# Patient Record
Sex: MALE | Race: WHITE | Employment: OTHER | URBAN - METROPOLITAN AREA
[De-identification: names, ages, dates, MRNs, and addresses within clinical notes are randomized per-mention and may not be internally consistent; named-entity substitution may affect disease eponyms.]

---

## 2017-05-18 ENCOUNTER — HOSPITAL ENCOUNTER (EMERGENCY)
Age: 57
Discharge: HOME | End: 2017-05-18
Attending: EMERGENCY MEDICINE
Payer: COMMERCIAL

## 2017-05-18 VITALS
RESPIRATION RATE: 16 BRPM | HEIGHT: 70 IN | BODY MASS INDEX: 22.9 KG/M2 | HEART RATE: 87 BPM | SYSTOLIC BLOOD PRESSURE: 124 MMHG | TEMPERATURE: 98 F | WEIGHT: 160 LBS | DIASTOLIC BLOOD PRESSURE: 82 MMHG | OXYGEN SATURATION: 95 %

## 2017-05-18 DIAGNOSIS — T15.92XA FB EYE, LEFT, INITIAL ENCOUNTER: Primary | ICD-10-CM

## 2017-05-18 PROCEDURE — G0382 LEV 3 HOSP TYPE B ED VISIT: HCPCS

## 2017-05-18 PROCEDURE — 65222 REMOVE FOREIGN BODY FROM EYE: CPT

## 2017-05-18 PROCEDURE — 6370000000 HC RX 637 (ALT 250 FOR IP): Performed by: PHYSICIAN ASSISTANT

## 2017-05-18 RX ORDER — PROPARACAINE HYDROCHLORIDE 5 MG/ML
1 SOLUTION/ DROPS OPHTHALMIC ONCE
Status: COMPLETED | OUTPATIENT
Start: 2017-05-18 | End: 2017-05-18

## 2017-05-18 RX ORDER — POLYMYXIN B SULFATE AND TRIMETHOPRIM 1; 10000 MG/ML; [USP'U]/ML
1 SOLUTION OPHTHALMIC EVERY 4 HOURS
Qty: 1 BOTTLE | Refills: 0 | Status: SHIPPED | OUTPATIENT
Start: 2017-05-18 | End: 2017-05-28

## 2017-05-18 RX ADMIN — PROPARACAINE HYDROCHLORIDE 1 DROP: 5 SOLUTION/ DROPS OPHTHALMIC at 12:22

## 2017-05-18 ASSESSMENT — ENCOUNTER SYMPTOMS
ABDOMINAL PAIN: 0
NAUSEA: 0
COUGH: 0
COLOR CHANGE: 0
PHOTOPHOBIA: 0
DIARRHEA: 0
VOMITING: 0
BACK PAIN: 0
EYE PAIN: 0
EYE ITCHING: 0
EYE DISCHARGE: 0
EYE REDNESS: 0
SHORTNESS OF BREATH: 0

## 2020-09-21 ENCOUNTER — HOSPITAL ENCOUNTER (OUTPATIENT)
Age: 60
Setting detail: SPECIMEN
Discharge: HOME | End: 2020-09-21
Payer: COMMERCIAL

## 2020-09-21 LAB
ALBUMIN SERPL-MCNC: 4.4 G/DL (ref 3.5–5.2)
ALBUMIN/GLOBULIN RATIO: 1.4 (ref 1–2.5)
ALP BLD-CCNC: 70 U/L (ref 40–129)
ALT SERPL-CCNC: 19 U/L (ref 5–41)
ANION GAP SERPL CALCULATED.3IONS-SCNC: 12 MMOL/L (ref 9–17)
AST SERPL-CCNC: 16 U/L
BILIRUB SERPL-MCNC: 0.82 MG/DL (ref 0.3–1.2)
BUN BLDV-MCNC: 11 MG/DL (ref 6–20)
BUN/CREAT BLD: NORMAL (ref 9–20)
CALCIUM SERPL-MCNC: 9.9 MG/DL (ref 8.6–10.4)
CHLORIDE BLD-SCNC: 101 MMOL/L (ref 98–107)
CHOLESTEROL/HDL RATIO: 6.7
CHOLESTEROL: 229 MG/DL
CO2: 26 MMOL/L (ref 20–31)
CREAT SERPL-MCNC: 0.79 MG/DL (ref 0.7–1.2)
GFR AFRICAN AMERICAN: >60 ML/MIN
GFR NON-AFRICAN AMERICAN: >60 ML/MIN
GFR SERPL CREATININE-BSD FRML MDRD: NORMAL ML/MIN/{1.73_M2}
GFR SERPL CREATININE-BSD FRML MDRD: NORMAL ML/MIN/{1.73_M2}
GLUCOSE BLD-MCNC: 89 MG/DL (ref 70–99)
HCT VFR BLD CALC: 51.5 % (ref 40.7–50.3)
HDLC SERPL-MCNC: 34 MG/DL
HEMOGLOBIN: 16.6 G/DL (ref 13–17)
LDL CHOLESTEROL: 155 MG/DL (ref 0–130)
MCH RBC QN AUTO: 31.3 PG (ref 25.2–33.5)
MCHC RBC AUTO-ENTMCNC: 32.2 G/DL (ref 28.4–34.8)
MCV RBC AUTO: 97.2 FL (ref 82.6–102.9)
NRBC AUTOMATED: 0 PER 100 WBC
PDW BLD-RTO: 13.4 % (ref 11.8–14.4)
PLATELET # BLD: 342 K/UL (ref 138–453)
PMV BLD AUTO: 10.4 FL (ref 8.1–13.5)
POTASSIUM SERPL-SCNC: 5.2 MMOL/L (ref 3.7–5.3)
PROSTATE SPECIFIC ANTIGEN: 5.42 UG/L
RBC # BLD: 5.3 M/UL (ref 4.21–5.77)
SODIUM BLD-SCNC: 139 MMOL/L (ref 135–144)
TOTAL PROTEIN: 7.5 G/DL (ref 6.4–8.3)
TRIGL SERPL-MCNC: 201 MG/DL
VITAMIN D 25-HYDROXY: 42.5 NG/ML (ref 30–100)
VLDLC SERPL CALC-MCNC: ABNORMAL MG/DL (ref 1–30)
WBC # BLD: 9.9 K/UL (ref 3.5–11.3)

## 2020-09-23 PROBLEM — F17.200 CURRENT SMOKER: Status: ACTIVE | Noted: 2018-06-07

## 2020-09-23 PROBLEM — Z12.11 ENCOUNTER FOR SCREENING FOR MALIGNANT NEOPLASM OF COLON: Status: ACTIVE | Noted: 2020-08-20

## 2020-09-23 PROBLEM — M50.30 DEGENERATIVE DISC DISEASE, CERVICAL: Status: ACTIVE | Noted: 2018-06-04

## 2020-09-23 PROBLEM — M19.019 OSTEOARTHRITIS OF AC (ACROMIOCLAVICULAR) JOINT: Status: ACTIVE | Noted: 2018-09-14

## 2020-09-23 PROBLEM — G89.18 POST-OP PAIN: Status: ACTIVE | Noted: 2018-06-07

## 2020-09-23 PROBLEM — M75.42 IMPINGEMENT SYNDROME OF LEFT SHOULDER: Status: ACTIVE | Noted: 2018-09-14

## 2020-09-25 ENCOUNTER — OFFICE VISIT (OUTPATIENT)
Dept: UROLOGY | Age: 60
End: 2020-09-25
Payer: COMMERCIAL

## 2020-09-25 VITALS — HEART RATE: 87 BPM | SYSTOLIC BLOOD PRESSURE: 102 MMHG | TEMPERATURE: 98.6 F | DIASTOLIC BLOOD PRESSURE: 75 MMHG

## 2020-09-25 PROCEDURE — 4004F PT TOBACCO SCREEN RCVD TLK: CPT | Performed by: UROLOGY

## 2020-09-25 PROCEDURE — G8420 CALC BMI NORM PARAMETERS: HCPCS | Performed by: UROLOGY

## 2020-09-25 PROCEDURE — 3017F COLORECTAL CA SCREEN DOC REV: CPT | Performed by: UROLOGY

## 2020-09-25 PROCEDURE — 99204 OFFICE O/P NEW MOD 45 MIN: CPT | Performed by: UROLOGY

## 2020-09-25 PROCEDURE — G8427 DOCREV CUR MEDS BY ELIG CLIN: HCPCS | Performed by: UROLOGY

## 2020-09-25 ASSESSMENT — ENCOUNTER SYMPTOMS
ABDOMINAL PAIN: 0
COLOR CHANGE: 0
EYE PAIN: 0
WHEEZING: 0
COUGH: 0
EYE REDNESS: 0
NAUSEA: 0
BACK PAIN: 0
VOMITING: 0
SHORTNESS OF BREATH: 0

## 2020-09-25 NOTE — LETTER
[address] [id number]  Dept: [phone]  Dept Fax: [phone]        9/25/20    Patient: Rae Amos  YOB: 1960    Dear ADE Gallagher CNP,    I had the pleasure of seeing one of your patients, Yusuf Tolliver today in the office today. Below are the relevant portions of my assessment and plan of care. IMPRESSION:  1. Elevated PSA        PLAN:  He was found to have an elevated PSA. He does not remember any previous PSAs. We discussed biopsy, but he wants to hold off for now. Will repeat PSA in 3 months. Prescriptions Ordered:  No orders of the defined types were placed in this encounter. Orders Placed:  Orders Placed This Encounter   Procedures    PSA, Diagnostic     Standing Status:   Future     Standing Expiration Date:   9/25/2021         Thank you for allowing me to participate in the care of this patient. I will keep you updated on this patient's follow up and I look forward to serving you and your patients again in the future.         Juarez Everett MD

## 2020-09-25 NOTE — PROGRESS NOTES
[address] [address]  Dept: [phone]  Dept Fax: [phone] [facility] Urology Office Note - New patient    Patient:  Sandrine Padilla  YOB: 1960  Date: 9/25/2020    The patient is a 61 y.o. male who presents todayfor evaluation of the following problems:   Chief Complaint   Patient presents with    Elevated PSA     ref by PCP    referred by ADE Vora CNP. HPI  He is here for elevated PSA. He recently was found to have an elevated PSA of 5.42. He did not have any voiding complaints at the time this PSA was done. His stream is good, but he goes more frequently than before. No FHx of prostate cancer. No hematuria or dysuria. He has not had a prostate infection in the past.     (Patient's old records have been requested, reviewed and summarized in today's note.)    Summary of old records: N/A    Additional History: N/A    Procedures Today: N/A    Last several PSA's:  Lab Results   Component Value Date    PSA 5.42 (H) 09/21/2020     Last total testosterone:  No results found for: TESTOSTERONE  Urinalysis today:  No results found for this visit on 09/25/20. AUA Symptom Score (9/25/2020):                               Last BUN and creatinine:  Lab Results   Component Value Date    BUN 11 09/21/2020     Lab Results   Component Value Date    CREATININE 0.79 09/21/2020       Additional Lab/Culture results: PSA 5.42. Imaging Reviewed during this Office Visit: none  (results were independently reviewed by physician and radiology report verified)    PAST MEDICAL, FAMILY AND SOCIAL HISTORY:  No past medical history on file. Past Surgical History:   Procedure Laterality Date    CERVICAL FUSION  2018    HERNIA REPAIR      ROTATOR CUFF REPAIR Right 2017     No family history on file.   No outpatient medications have been marked as taking for the 9/25/20 encounter (Office Visit) with [facility] MD Zackary.        Patient has no known allergies. Social History     Tobacco Use   Smoking Status Current Every Day Smoker    Packs/day: 0.75    Years: 25.00    Pack years: 18.75    Types: Cigarettes   Smokeless Tobacco Never Used      (If patient a smoker, smoking cessation counseling offered)   Social History     Substance and Sexual Activity   Alcohol Use No       REVIEW OF SYSTEMS:  Review of Systems    Physical Exam:    This a 61 y.o. male   Vitals:    09/25/20 1203   BP: 102/75   Pulse: 87   Temp: 98.6 °F (37 °C)     There is no height or weight on file to calculate BMI. Physical Exam  Constitutional: Patient in no acute distress. Neuro: Alert and oriented to person, place and time. Psych: Mood normal, affect normal  Skin: No rash noted  Lungs:Respiratory effort is normal  Cardiovascular: Warm & Pink  Abdomen: Soft, non-tender, non-distendedwith no CVA,  No flank tenderness,  Orhepatosplenomegaly   Lymphatics: No palpable lymphadenopathy. Bladder non-tender and not distended. Musculoskeletal: Normal gait and station  Penis normal and circumcised  Urethral meatus normal  Scrotal exam normal  Testicles normal bilaterally  Epididymis normal bilaterally  No evidence of inguinal hernia  Normal rectal tone with no masses  Prostate:  40 grams, smooth, no nodules. Assessment and Plan      1. Elevated PSA           Plan:        He was found to have an elevated PSA. He does not remember any previous PSAs. We discussed biopsy, but he wants to hold off for now. Will repeat PSA in 3 months. Prescriptions Ordered:  No orders of the defined types were placed in this encounter. Orders Placed:  Orders Placed This Encounter   Procedures    PSA, Diagnostic     Standing Status:   Future     Standing Expiration Date:   9/25/2021             Shellie Thayer MD    Agree with the ROS entered by the MA.

## 2020-10-05 ENCOUNTER — TELEPHONE (OUTPATIENT)
Dept: ONCOLOGY | Age: 60
End: 2020-10-05

## 2020-10-07 ENCOUNTER — TELEPHONE (OUTPATIENT)
Dept: ONCOLOGY | Age: 60
End: 2020-10-07

## 2020-10-07 NOTE — LETTER
10/7/2020        [address] [place] [id number]    Dear Flaca Randall: Your healthcare provider has ordered a low dose CT scan of the chest for lung cancer screening. You will find enclosed, information about CT lung screening. Please review the statement of understanding, you will be asked to sign a copy of this at the time of your CT scan    If you have not already been contacted to make the appointment for your scan, please call our scheduling department at [phone]    Keep in mind that CT lung screening does not take the place of smoking cessation. If you are a current smoker, you will find enclosed smoking cessation resources. Please do not hesitate to contact me if you have any questions or concerns.     [address] [place] [address],      [facility] Lung Screening [facility]  [phone]

## 2020-10-08 ENCOUNTER — HOSPITAL ENCOUNTER (OUTPATIENT)
Dept: CT IMAGING | Age: 60
Discharge: HOME | End: 2020-10-10
Payer: COMMERCIAL

## 2020-10-08 PROCEDURE — G0297 LDCT FOR LUNG CA SCREEN: HCPCS

## 2020-10-23 PROBLEM — Z12.11 ENCOUNTER FOR SCREENING FOR MALIGNANT NEOPLASM OF COLON: Status: RESOLVED | Noted: 2020-08-20 | Resolved: 2020-10-23

## 2020-12-31 ENCOUNTER — TELEPHONE (OUTPATIENT)
Dept: UROLOGY | Age: 60
End: 2020-12-31

## 2020-12-31 NOTE — TELEPHONE ENCOUNTER
Called pt LMOM to please complete lab work prior to appt. If unable to complete appt will be rescheduled. Call office with questions.

## 2021-01-04 ENCOUNTER — TELEPHONE (OUTPATIENT)
Dept: UROLOGY | Age: 61
End: 2021-01-04

## 2021-02-25 ENCOUNTER — HOSPITAL ENCOUNTER (OUTPATIENT)
Age: 61
Setting detail: SPECIMEN
Discharge: HOME | End: 2021-02-25
Payer: MEDICARE

## 2021-02-25 DIAGNOSIS — R97.20 ELEVATED PSA: ICD-10-CM

## 2021-02-25 LAB — PROSTATE SPECIFIC ANTIGEN: 3.28 UG/L

## 2021-09-21 ENCOUNTER — TELEPHONE (OUTPATIENT)
Dept: ONCOLOGY | Age: 61
End: 2021-09-21

## 2021-11-12 ENCOUNTER — TELEPHONE (OUTPATIENT)
Dept: ONCOLOGY | Age: 61
End: 2021-11-12

## 2023-02-08 ENCOUNTER — HOSPITAL ENCOUNTER (OUTPATIENT)
Age: 63
Setting detail: SPECIMEN
Discharge: HOME | End: 2023-02-08

## 2023-02-08 DIAGNOSIS — E78.41 ELEVATED LIPOPROTEIN(A): ICD-10-CM

## 2023-02-08 DIAGNOSIS — Z00.00 HEALTHCARE MAINTENANCE: ICD-10-CM

## 2023-02-08 LAB
ABSOLUTE EOS #: 0.24 K/UL (ref 0–0.44)
ABSOLUTE IMMATURE GRANULOCYTE: 0.03 K/UL (ref 0–0.3)
ABSOLUTE LYMPH #: 2.79 K/UL (ref 1.1–3.7)
ABSOLUTE MONO #: 0.69 K/UL (ref 0.1–1.2)
ALBUMIN SERPL-MCNC: 4.2 G/DL (ref 3.5–5.2)
ALBUMIN/GLOBULIN RATIO: 1.4 (ref 1–2.5)
ALP SERPL-CCNC: 74 U/L (ref 40–129)
ALT SERPL-CCNC: 14 U/L (ref 5–41)
ANION GAP SERPL CALCULATED.3IONS-SCNC: 15 MMOL/L (ref 9–17)
AST SERPL-CCNC: 19 U/L
BASOPHILS # BLD: 1 % (ref 0–2)
BASOPHILS ABSOLUTE: 0.1 K/UL (ref 0–0.2)
BILIRUB SERPL-MCNC: 1.2 MG/DL (ref 0.3–1.2)
BUN SERPL-MCNC: 11 MG/DL (ref 8–23)
CALCIUM SERPL-MCNC: 9.1 MG/DL (ref 8.6–10.4)
CHLORIDE SERPL-SCNC: 103 MMOL/L (ref 98–107)
CHOLEST SERPL-MCNC: 188 MG/DL
CHOLESTEROL/HDL RATIO: 5.5
CO2 SERPL-SCNC: 21 MMOL/L (ref 20–31)
CREAT SERPL-MCNC: 0.81 MG/DL (ref 0.7–1.2)
EOSINOPHILS RELATIVE PERCENT: 3 % (ref 1–4)
GFR SERPL CREATININE-BSD FRML MDRD: >60 ML/MIN/1.73M2
GLUCOSE SERPL-MCNC: 94 MG/DL (ref 70–99)
HCT VFR BLD AUTO: 48.7 % (ref 40.7–50.3)
HCV AB SER QL: NONREACTIVE
HDLC SERPL-MCNC: 34 MG/DL
HGB BLD-MCNC: 16 G/DL (ref 13–17)
HIV 1+2 AB+HIV1 P24 AG SERPL QL IA: NONREACTIVE
IMMATURE GRANULOCYTES: 0 %
LDLC SERPL CALC-MCNC: 132 MG/DL (ref 0–130)
LYMPHOCYTES # BLD: 29 % (ref 24–43)
MCH RBC QN AUTO: 31.6 PG (ref 25.2–33.5)
MCHC RBC AUTO-ENTMCNC: 32.9 G/DL (ref 28.4–34.8)
MCV RBC AUTO: 96.1 FL (ref 82.6–102.9)
MONOCYTES # BLD: 7 % (ref 3–12)
NRBC AUTOMATED: 0 PER 100 WBC
PDW BLD-RTO: 13.5 % (ref 11.8–14.4)
PLATELET # BLD AUTO: 327 K/UL (ref 138–453)
PMV BLD AUTO: 10.3 FL (ref 8.1–13.5)
POTASSIUM SERPL-SCNC: 4.8 MMOL/L (ref 3.7–5.3)
PROT SERPL-MCNC: 7.2 G/DL (ref 6.4–8.3)
RBC # BLD: 5.07 M/UL (ref 4.21–5.77)
SEG NEUTROPHILS: 60 % (ref 36–65)
SEGMENTED NEUTROPHILS ABSOLUTE COUNT: 5.81 K/UL (ref 1.5–8.1)
SODIUM SERPL-SCNC: 139 MMOL/L (ref 135–144)
T4 FREE SERPL-MCNC: 1.13 NG/DL (ref 0.93–1.7)
TRIGL SERPL-MCNC: 112 MG/DL
TSH SERPL-ACNC: 1.57 UIU/ML (ref 0.3–5)
WBC # BLD AUTO: 9.7 K/UL (ref 3.5–11.3)

## 2023-02-09 ENCOUNTER — TELEPHONE (OUTPATIENT)
Dept: SURGERY | Age: 63
End: 2023-02-09

## 2023-02-09 NOTE — TELEPHONE ENCOUNTER
[facility] Surgery  Screening colonoscopy questionnaire  Miranda Baldwin    Pt Name: Faby Yeung  MRN: [id number]  YOB: 1960  Primary Care Physician: Kevin Villasenor MD      Have you ever had a colonoscopy? NO  When was your last colonoscopy? N/A  Were any polyps removed or any other significant findings? N/A    Have you recently had a stool test to check for colon cancer? Yes  Was it positive? No    Do you have any family history of colon cancer? No  If yes, which family member had colon cancer? N/A  Were they diagnosed younger or older than the age of 61? N/A    Do you have a history of Crohn's disease or Ulcerative Colitis? No    Do you have a history of constipation? No    Do you have a history of bloody or black stools? No    Have you ever had surgery done inside your abdomen? Yes  What surgery? 3 INGUINAL HERNIA REPAIRS, LAST ONE WAS 10 YEARS AGO. Schedulin/9/23- Spoke to patient, colonoscopy scheduled at [facility], patient confirmed and information mailed to patient. Surgery date/time: 23 at [address] [place] [address] time: 6:30AM  Prep appt: PHONE CALL at 1 [facility] 3711, SUTAB INSTRUCTIONS MAILED TO PATIENT.

## 2023-04-12 PROBLEM — F17.210 CIGARETTE NICOTINE DEPENDENCE, UNCOMPLICATED: Status: ACTIVE | Noted: 2023-04-12

## 2023-04-12 PROBLEM — J34.89 NASAL SORE: Status: ACTIVE | Noted: 2023-04-12

## 2023-04-25 ENCOUNTER — TELEPHONE (OUTPATIENT)
Dept: ONCOLOGY | Age: 63
End: 2023-04-25

## 2023-05-02 ENCOUNTER — HOSPITAL ENCOUNTER (OUTPATIENT)
Dept: CT IMAGING | Age: 63
Discharge: HOME | End: 2023-05-04
Payer: MEDICARE

## 2023-05-02 VITALS — HEIGHT: 69 IN | BODY MASS INDEX: 22.22 KG/M2 | WEIGHT: 150 LBS

## 2023-05-02 DIAGNOSIS — Z87.891 PERSONAL HISTORY OF TOBACCO USE: ICD-10-CM

## 2023-05-02 PROCEDURE — 71271 CT THORAX LUNG CANCER SCR C-: CPT

## 2023-06-15 ENCOUNTER — HOSPITAL ENCOUNTER (INPATIENT)
Age: 63
LOS: 3 days | Discharge: HOME | DRG: 881 | End: 2023-06-18
Attending: EMERGENCY MEDICINE | Admitting: PSYCHIATRY & NEUROLOGY
Payer: MEDICARE

## 2023-06-15 DIAGNOSIS — F32.A DEPRESSION WITH SUICIDAL IDEATION: Primary | ICD-10-CM

## 2023-06-15 DIAGNOSIS — R45.851 DEPRESSION WITH SUICIDAL IDEATION: Primary | ICD-10-CM

## 2023-06-15 LAB
ALBUMIN SERPL-MCNC: 4.1 G/DL (ref 3.5–5.2)
ALP SERPL-CCNC: 77 U/L (ref 40–129)
ALT SERPL-CCNC: 26 U/L (ref 5–41)
AMPHET UR QL SCN: NEGATIVE
ANION GAP SERPL CALCULATED.3IONS-SCNC: 11 MMOL/L (ref 9–17)
APAP SERPL-MCNC: <5 UG/ML (ref 10–30)
AST SERPL-CCNC: 19 U/L
BARBITURATES UR QL SCN: NEGATIVE
BASOPHILS # BLD: 0.1 K/UL (ref 0–0.2)
BASOPHILS NFR BLD: 1 % (ref 0–2)
BENZODIAZ UR QL: NEGATIVE
BILIRUB SERPL-MCNC: 1.4 MG/DL (ref 0.3–1.2)
BUN SERPL-MCNC: 13 MG/DL (ref 8–23)
CALCIUM SERPL-MCNC: 9.5 MG/DL (ref 8.6–10.4)
CANNABINOID SCREEN URINE: NEGATIVE
CHLORIDE SERPL-SCNC: 102 MMOL/L (ref 98–107)
CO2 SERPL-SCNC: 24 MMOL/L (ref 20–31)
COCAINE UR QL SCN: NEGATIVE
CREAT SERPL-MCNC: 0.69 MG/DL (ref 0.7–1.2)
EOSINOPHIL # BLD: 0.1 K/UL (ref 0–0.4)
EOSINOPHILS RELATIVE PERCENT: 1 % (ref 0–4)
ERYTHROCYTE [DISTWIDTH] IN BLOOD BY AUTOMATED COUNT: 14.5 % (ref 11.5–14.9)
ETHANOL PERCENT: <0.01 %
ETHANOLAMINE SERPL-MCNC: <10 MG/DL
FENTANYL URINE: NEGATIVE
GFR SERPL CREATININE-BSD FRML MDRD: >60 ML/MIN/1.73M2
GLUCOSE SERPL-MCNC: 114 MG/DL (ref 70–99)
HCT VFR BLD AUTO: 43.6 % (ref 41–53)
HGB BLD-MCNC: 15.1 G/DL (ref 13.5–17.5)
LYMPHOCYTES # BLD: 29 % (ref 24–44)
LYMPHOCYTES NFR BLD: 2.9 K/UL (ref 1–4.8)
MAGNESIUM SERPL-MCNC: 2.1 MG/DL (ref 1.6–2.6)
MCH RBC QN AUTO: 32.4 PG (ref 26–34)
MCHC RBC AUTO-ENTMCNC: 34.6 G/DL (ref 31–37)
MCV RBC AUTO: 93.5 FL (ref 80–100)
METHADONE SCREEN, URINE: NEGATIVE
MONOCYTES NFR BLD: 0.8 K/UL (ref 0.1–1.3)
MONOCYTES NFR BLD: 8 % (ref 1–7)
NEUTROPHILS NFR BLD: 61 % (ref 36–66)
NEUTS SEG NFR BLD: 6.2 K/UL (ref 1.3–9.1)
OPIATES UR QL SCN: NEGATIVE
OXYCODONE SCREEN URINE: NEGATIVE
PCP UR QL SCN: NEGATIVE
PLATELET # BLD AUTO: 303 K/UL (ref 150–450)
PMV BLD AUTO: 7.1 FL (ref 6–12)
POTASSIUM SERPL-SCNC: 3.8 MMOL/L (ref 3.7–5.3)
PROT SERPL-MCNC: 6.9 G/DL (ref 6.4–8.3)
RBC # BLD AUTO: 4.67 M/UL (ref 4.5–5.9)
SALICYLATES SERPL-MCNC: <1 MG/DL (ref 3–10)
SODIUM SERPL-SCNC: 137 MMOL/L (ref 135–144)
TEST INFORMATION: NORMAL
WBC OTHER # BLD: 9.9 K/UL (ref 3.5–11)

## 2023-06-15 PROCEDURE — 80307 DRUG TEST PRSMV CHEM ANLYZR: CPT

## 2023-06-15 PROCEDURE — 99285 EMERGENCY DEPT VISIT HI MDM: CPT

## 2023-06-15 PROCEDURE — 36415 COLL VENOUS BLD VENIPUNCTURE: CPT

## 2023-06-15 PROCEDURE — 83735 ASSAY OF MAGNESIUM: CPT

## 2023-06-15 PROCEDURE — G0480 DRUG TEST DEF 1-7 CLASSES: HCPCS

## 2023-06-15 PROCEDURE — 80179 DRUG ASSAY SALICYLATE: CPT

## 2023-06-15 PROCEDURE — 80143 DRUG ASSAY ACETAMINOPHEN: CPT

## 2023-06-15 PROCEDURE — 80053 COMPREHEN METABOLIC PANEL: CPT

## 2023-06-15 PROCEDURE — 1240000000 HC EMOTIONAL WELLNESS R&B

## 2023-06-15 PROCEDURE — 85027 COMPLETE CBC AUTOMATED: CPT

## 2023-06-15 RX ORDER — HYDROXYZINE 50 MG/1
50 TABLET, FILM COATED ORAL 3 TIMES DAILY PRN
Status: DISCONTINUED | OUTPATIENT
Start: 2023-06-15 | End: 2023-06-18 | Stop reason: HOSPADM

## 2023-06-15 RX ORDER — TRAZODONE HYDROCHLORIDE 50 MG/1
50 TABLET ORAL NIGHTLY PRN
Status: DISCONTINUED | OUTPATIENT
Start: 2023-06-16 | End: 2023-06-18 | Stop reason: HOSPADM

## 2023-06-15 RX ORDER — POLYETHYLENE GLYCOL 3350 17 G/17G
17 POWDER, FOR SOLUTION ORAL DAILY PRN
Status: DISCONTINUED | OUTPATIENT
Start: 2023-06-15 | End: 2023-06-18 | Stop reason: HOSPADM

## 2023-06-15 RX ORDER — ACETAMINOPHEN 325 MG/1
650 TABLET ORAL EVERY 4 HOURS PRN
Status: DISCONTINUED | OUTPATIENT
Start: 2023-06-15 | End: 2023-06-18 | Stop reason: HOSPADM

## 2023-06-15 RX ORDER — IBUPROFEN 400 MG/1
400 TABLET ORAL EVERY 6 HOURS PRN
Status: DISCONTINUED | OUTPATIENT
Start: 2023-06-15 | End: 2023-06-18 | Stop reason: HOSPADM

## 2023-06-15 RX ORDER — MAGNESIUM HYDROXIDE/ALUMINUM HYDROXICE/SIMETHICONE 120; 1200; 1200 MG/30ML; MG/30ML; MG/30ML
30 SUSPENSION ORAL EVERY 6 HOURS PRN
Status: DISCONTINUED | OUTPATIENT
Start: 2023-06-15 | End: 2023-06-18 | Stop reason: HOSPADM

## 2023-06-15 ASSESSMENT — ENCOUNTER SYMPTOMS
SHORTNESS OF BREATH: 0
COLOR CHANGE: 0
ABDOMINAL PAIN: 0
EYE PAIN: 0
BACK PAIN: 0

## 2023-06-15 ASSESSMENT — LIFESTYLE VARIABLES
HOW MANY STANDARD DRINKS CONTAINING ALCOHOL DO YOU HAVE ON A TYPICAL DAY: PATIENT DOES NOT DRINK
HOW OFTEN DO YOU HAVE A DRINK CONTAINING ALCOHOL: NEVER

## 2023-06-15 ASSESSMENT — PAIN - FUNCTIONAL ASSESSMENT: PAIN_FUNCTIONAL_ASSESSMENT: NONE - DENIES PAIN

## 2023-06-16 PROCEDURE — 99223 1ST HOSP IP/OBS HIGH 75: CPT

## 2023-06-16 PROCEDURE — 1240000000 HC EMOTIONAL WELLNESS R&B

## 2023-06-16 PROCEDURE — 6370000000 HC RX 637 (ALT 250 FOR IP): Performed by: PSYCHIATRY & NEUROLOGY

## 2023-06-16 PROCEDURE — 6370000000 HC RX 637 (ALT 250 FOR IP): Performed by: NURSE PRACTITIONER

## 2023-06-16 RX ORDER — LORAZEPAM 2 MG/ML
2 INJECTION INTRAMUSCULAR EVERY 6 HOURS PRN
Status: DISCONTINUED | OUTPATIENT
Start: 2023-06-16 | End: 2023-06-18 | Stop reason: HOSPADM

## 2023-06-16 RX ORDER — DIPHENHYDRAMINE HYDROCHLORIDE 50 MG/ML
50 INJECTION INTRAMUSCULAR; INTRAVENOUS EVERY 6 HOURS PRN
Status: DISCONTINUED | OUTPATIENT
Start: 2023-06-16 | End: 2023-06-18 | Stop reason: HOSPADM

## 2023-06-16 RX ORDER — MIRTAZAPINE 15 MG/1
7.5 TABLET, FILM COATED ORAL NIGHTLY
Status: DISCONTINUED | OUTPATIENT
Start: 2023-06-16 | End: 2023-06-18 | Stop reason: HOSPADM

## 2023-06-16 RX ORDER — ATORVASTATIN CALCIUM 20 MG/1
20 TABLET, FILM COATED ORAL NIGHTLY
Status: DISCONTINUED | OUTPATIENT
Start: 2023-06-16 | End: 2023-06-18 | Stop reason: HOSPADM

## 2023-06-16 RX ORDER — LORAZEPAM 1 MG/1
2 TABLET ORAL EVERY 6 HOURS PRN
Status: DISCONTINUED | OUTPATIENT
Start: 2023-06-16 | End: 2023-06-18 | Stop reason: HOSPADM

## 2023-06-16 RX ORDER — HALOPERIDOL 5 MG/ML
5 INJECTION INTRAMUSCULAR EVERY 6 HOURS PRN
Status: DISCONTINUED | OUTPATIENT
Start: 2023-06-16 | End: 2023-06-18 | Stop reason: HOSPADM

## 2023-06-16 RX ORDER — HALOPERIDOL 5 MG/1
5 TABLET ORAL EVERY 6 HOURS PRN
Status: DISCONTINUED | OUTPATIENT
Start: 2023-06-16 | End: 2023-06-18 | Stop reason: HOSPADM

## 2023-06-16 RX ADMIN — ATORVASTATIN CALCIUM 20 MG: 20 TABLET, FILM COATED ORAL at 21:05

## 2023-06-16 RX ADMIN — HYDROXYZINE HYDROCHLORIDE 50 MG: 50 TABLET, FILM COATED ORAL at 14:24

## 2023-06-16 RX ADMIN — TRAZODONE HYDROCHLORIDE 50 MG: 50 TABLET ORAL at 21:11

## 2023-06-16 RX ADMIN — TRAZODONE HYDROCHLORIDE 50 MG: 50 TABLET ORAL at 00:46

## 2023-06-16 ASSESSMENT — LIFESTYLE VARIABLES
HOW OFTEN DO YOU HAVE A DRINK CONTAINING ALCOHOL: NEVER
HOW MANY STANDARD DRINKS CONTAINING ALCOHOL DO YOU HAVE ON A TYPICAL DAY: PATIENT DOES NOT DRINK
HOW OFTEN DO YOU HAVE A DRINK CONTAINING ALCOHOL: NEVER
HOW MANY STANDARD DRINKS CONTAINING ALCOHOL DO YOU HAVE ON A TYPICAL DAY: PATIENT DOES NOT DRINK

## 2023-06-16 ASSESSMENT — SLEEP AND FATIGUE QUESTIONNAIRES
AVERAGE NUMBER OF SLEEP HOURS: 3
SLEEP PATTERN: DIFFICULTY FALLING ASLEEP;RESTLESSNESS
DO YOU HAVE DIFFICULTY SLEEPING: YES
DO YOU USE A SLEEP AID: NO

## 2023-06-16 ASSESSMENT — PATIENT HEALTH QUESTIONNAIRE - PHQ9: SUM OF ALL RESPONSES TO PHQ QUESTIONS 1-9: 12

## 2023-06-16 ASSESSMENT — PAIN SCALES - GENERAL: PAINLEVEL_OUTOF10: 0

## 2023-06-17 PROCEDURE — 1240000000 HC EMOTIONAL WELLNESS R&B

## 2023-06-17 PROCEDURE — 6370000000 HC RX 637 (ALT 250 FOR IP): Performed by: NURSE PRACTITIONER

## 2023-06-17 PROCEDURE — 99222 1ST HOSP IP/OBS MODERATE 55: CPT | Performed by: INTERNAL MEDICINE

## 2023-06-17 PROCEDURE — 6370000000 HC RX 637 (ALT 250 FOR IP)

## 2023-06-17 PROCEDURE — 99232 SBSQ HOSP IP/OBS MODERATE 35: CPT | Performed by: NURSE PRACTITIONER

## 2023-06-17 RX ADMIN — ATORVASTATIN CALCIUM 20 MG: 20 TABLET, FILM COATED ORAL at 20:44

## 2023-06-17 RX ADMIN — MIRTAZAPINE 7.5 MG: 15 TABLET, FILM COATED ORAL at 20:44

## 2023-06-17 ASSESSMENT — PAIN SCALES - GENERAL: PAINLEVEL_OUTOF10: 0

## 2023-06-18 VITALS
TEMPERATURE: 97.1 F | RESPIRATION RATE: 14 BRPM | OXYGEN SATURATION: 97 % | HEIGHT: 69 IN | SYSTOLIC BLOOD PRESSURE: 96 MMHG | DIASTOLIC BLOOD PRESSURE: 62 MMHG | HEART RATE: 67 BPM | WEIGHT: 135 LBS | BODY MASS INDEX: 19.99 KG/M2

## 2023-06-18 PROCEDURE — 99239 HOSP IP/OBS DSCHRG MGMT >30: CPT | Performed by: NURSE PRACTITIONER

## 2023-06-18 PROCEDURE — 99231 SBSQ HOSP IP/OBS SF/LOW 25: CPT | Performed by: INTERNAL MEDICINE

## 2023-06-18 RX ORDER — MIRTAZAPINE 7.5 MG/1
7.5 TABLET, FILM COATED ORAL NIGHTLY
Qty: 30 TABLET | Refills: 0 | Status: SHIPPED | OUTPATIENT
Start: 2023-06-18

## 2023-06-18 RX ORDER — HYDROXYZINE 50 MG/1
50 TABLET, FILM COATED ORAL 3 TIMES DAILY PRN
Qty: 30 TABLET | Refills: 0 | Status: SHIPPED | OUTPATIENT
Start: 2023-06-18 | End: 2023-06-28

## 2023-06-18 RX ORDER — TRAZODONE HYDROCHLORIDE 50 MG/1
50 TABLET ORAL NIGHTLY PRN
Qty: 30 TABLET | Refills: 0 | Status: SHIPPED | OUTPATIENT
Start: 2023-06-18

## 2023-06-19 NOTE — CARE COORDINATION
Social work contacted pt with information for [facility] walk in clinic M-Thurs 9-4. Pt agreed to follow up.

## 2023-06-19 NOTE — CARE COORDINATION
Name: Janie Rogers    : 1960    Discharge Date: 23    Primary Auth/Cert #: [id number]    Destination: home     Discharge Medications:      Medication List        START taking these medications      hydrOXYzine HCl 50 MG tablet  Commonly known as: ATARAX  Take 1 tablet by mouth 3 times daily as needed for Anxiety  Notes to patient: anxiety     mirtazapine 7.5 MG tablet  Commonly known as: REMERON  Take 1 tablet by mouth nightly  Notes to patient: Sleep aid     traZODone 50 MG tablet  Commonly known as: DESYREL  Take 1 tablet by mouth nightly as needed for Sleep  Notes to patient: Sleep aid            CONTINUE taking these medications      artificial tears 0.1-0.3 % Soln opthalmic solution  Generic drug: dextran 70-hypromellose  Place 1 drop into both eyes every 4 hours as needed (Dry Eyes)  Notes to patient: Dry eyes     aspirin 81 MG EC tablet  Notes to patient: prophylactic     atorvastatin 20 MG tablet  Commonly known as: LIPITOR  Take 1 tablet by mouth daily  Notes to patient: cholesterol     Chantix Starting Month Robin 0.5 MG X 11 & 1 MG X 42 Tbpk  Generic drug: Varenicline Tartrate (Starter)  0.5mg daily for 3 days, then 0.5mg twice daily for 4 days, then 1mg twice daily for 12 weeks  Notes to patient: Smoking cessation     ibuprofen 800 MG tablet  Commonly known as: ADVIL;MOTRIN  Take 1 tablet by mouth every 8 hours as needed for Pain  Notes to patient: pain     sildenafil 50 MG tablet  Commonly known as: VIAGRA  Take 1 tablet by mouth daily as needed for Erectile Dysfunction  Notes to patient: Erectile maintanence     sodium chloride 0.65 % nasal spray  Commonly known as:  [facility] Spray  1 spray by Nasal route as needed for Congestion  Notes to patient: Nasal congestion     Sutab 3505-197-162 MG Tabs  Generic drug: Sodium Sulfate-Mag Sulfate-KCl  Take as directed by office for colonoscopy  Notes to patient: Colonoscopy prep     triamcinolone 0.1 % ointment  Commonly known as: KENALOG  Apply

## 2023-12-01 NOTE — PROGRESS NOTES
Patient instructed to remove shoes and socks and instructed to sit in exam chair. Current PCP is Ree Duarte MD and date of last visit was 11/14/23. Do you have a follow up visit scheduled? Yes  If yes, the date is 03/27/2024.

## 2023-12-04 ENCOUNTER — OFFICE VISIT (OUTPATIENT)
Dept: PODIATRY | Age: 63
End: 2023-12-04
Payer: MEDICARE

## 2023-12-04 VITALS
HEIGHT: 69 IN | BODY MASS INDEX: 20.73 KG/M2 | WEIGHT: 140 LBS | SYSTOLIC BLOOD PRESSURE: 119 MMHG | HEART RATE: 88 BPM | DIASTOLIC BLOOD PRESSURE: 80 MMHG

## 2023-12-04 DIAGNOSIS — M79.671 RIGHT FOOT PAIN: ICD-10-CM

## 2023-12-04 DIAGNOSIS — L60.0 INGROWN NAIL: Primary | ICD-10-CM

## 2023-12-04 DIAGNOSIS — F17.200 CURRENT SMOKER: ICD-10-CM

## 2023-12-04 PROCEDURE — 11720 DEBRIDE NAIL 1-5: CPT | Performed by: PODIATRIST

## 2023-12-04 PROCEDURE — 99203 OFFICE O/P NEW LOW 30 MIN: CPT

## 2023-12-04 PROCEDURE — 11720 DEBRIDE NAIL 1-5: CPT

## 2023-12-04 PROCEDURE — 99213 OFFICE O/P EST LOW 20 MIN: CPT | Performed by: PODIATRIST

## 2023-12-04 NOTE — PROGRESS NOTES
[address] [place] [address]  Tel: [phone]   Fax: [phone]    Subjective     CC: Right medial hallux pain    HPI:  Sabi Smalls is a 61y.o. year old male who presents to clinic today complaining of right medial hallux pain. Patient states that pain has been present for quite some time. Admits to increased pain with pressure to the area. No active drainage and erythema noted to the area. Has history of ingrown nails that were taken by podiatrist was prescribed at this time. Denies any nausea, vomiting, fever, chills, shortness of breath, chest pain or calf pain noted. Primary care physician is Bucky Galan MD.    ROS:    Constitutional: Denies nausea, vomiting, fever, chills. Neurologic: Denies numbness, tingling, and burning in the feet. Vascular: Denies symptoms of lower extremity claudication. Skin: Denies open wounds. Otherwise negative except as noted in the HPI. PMH:  Past Medical History:   Diagnosis Date    Hyperlipidemia        Surgical History:   Past Surgical History:   Procedure Laterality Date    CERVICAL FUSION  2018    HERNIA REPAIR      ROTATOR CUFF REPAIR Right 2017       Social History:  Social History     Tobacco Use    Smoking status: Every Day     Packs/day: 1.25     Years: 38.00     Additional pack years: 0.00     Total pack years: 47.50     Types: Cigarettes    Smokeless tobacco: Never    Tobacco comments:     updated from patient - insurance does not cover medications   Vaping Use    Vaping Use: Never used   Substance Use Topics    Alcohol use: Yes     Comment: occ. Drug use: No       Medications:  Prior to Admission medications    Medication Sig Start Date End Date Taking?  Authorizing Provider   nicotine (NICOTROL NS) 10 MG/ML SOLN nasal spray 1 spray by Nasal route as needed for Smoking cessation 1 to 2 dose/hour (each dose is 2 sprays, one in each nostril) adjust dose as needed based on symptoms; do not

## 2024-03-27 PROBLEM — F32.A DEPRESSION WITH SUICIDAL IDEATION: Status: RESOLVED | Noted: 2023-06-15 | Resolved: 2024-03-27

## 2024-03-27 PROBLEM — R45.851 DEPRESSION WITH SUICIDAL IDEATION: Status: RESOLVED | Noted: 2023-06-15 | Resolved: 2024-03-27

## 2024-03-27 PROBLEM — F32.1 CURRENT MODERATE EPISODE OF MAJOR DEPRESSIVE DISORDER WITHOUT PRIOR EPISODE (HCC): Status: RESOLVED | Noted: 2024-03-27 | Resolved: 2024-03-27

## 2024-03-27 PROBLEM — F32.1 CURRENT MODERATE EPISODE OF MAJOR DEPRESSIVE DISORDER WITHOUT PRIOR EPISODE (HCC): Status: ACTIVE | Noted: 2024-03-27

## 2024-04-02 ENCOUNTER — TELEPHONE (OUTPATIENT)
Dept: ONCOLOGY | Age: 64
End: 2024-04-02

## 2024-04-08 ENCOUNTER — OFFICE VISIT (OUTPATIENT)
Dept: ORTHOPEDIC SURGERY | Age: 64
End: 2024-04-08
Payer: MEDICARE

## 2024-04-08 VITALS — HEIGHT: 69 IN | RESPIRATION RATE: 16 BRPM | WEIGHT: 149.47 LBS | BODY MASS INDEX: 22.14 KG/M2

## 2024-04-08 DIAGNOSIS — M75.82 ROTATOR CUFF TENDINITIS, LEFT: Primary | ICD-10-CM

## 2024-04-08 DIAGNOSIS — M25.512 LEFT SHOULDER PAIN, UNSPECIFIED CHRONICITY: ICD-10-CM

## 2024-04-08 PROCEDURE — 99203 OFFICE O/P NEW LOW 30 MIN: CPT | Performed by: ORTHOPAEDIC SURGERY

## 2024-04-08 RX ORDER — DICLOFENAC SODIUM 75 MG/1
75 TABLET, DELAYED RELEASE ORAL 2 TIMES DAILY WITH MEALS
Qty: 28 TABLET | Refills: 0 | Status: SHIPPED | OUTPATIENT
Start: 2024-04-08 | End: 2024-04-22

## 2024-04-08 NOTE — PROGRESS NOTES
Orthopedic Shoulder Encounter Note     Chief complaint: left shoulder pain    HPI: Oneil Madera is a 63 y.o.  right-hand dominant male who presents for evaluation of his left shoulder.  He indicates that he has been having some pain in the shoulder for about 13 years.  He states that he has hurt his shoulder several times but does not recall a specific injury that precipitated the pain that he is currently experiencing.  He describes having pain primarily localized to the posterior superior aspect of the shoulder.  Is typically present trying to reach to or above shoulder level.  He describes having some associated weakness but no stiffness.  He has minimal pain at nighttime.  He has not attempted any treatment for this other than taking over-the-counter NSAIDs.    Previous treatment:    NSAIDs: Ibuprofen    Physical Therapy: No    Injections: None    Surgeries: Status post right shoulder rotator cuff repair in 2017 by Dr. Kevyn Riojas    Review of Systems:   Constitutional: Negative for fever, chills, sweats.   Pain Score:   2  Neurological: Negative for headache, numbness, or weakness.   Musculoskeletal: As noted in HPI     Past Medical History  Oneil  has a past medical history of Hyperlipidemia.    Past Surgical History  Oneil  has a past surgical history that includes hernia repair; cervical fusion (2018); and Rotator cuff repair (Right, 2017).    Current Medications  Current Outpatient Medications   Medication Sig Dispense Refill    varenicline (CHANTIX) 0.5 MG tablet Take 1-2 tablets by mouth See Admin Instructions 0.5mg DAILY for 3 days followed by 0.5mg TWICE DAILY for 4 days followed by 1mg TWICE DAILY 57 tablet 0    atorvastatin (LIPITOR) 40 MG tablet Take 1 tablet by mouth daily 90 tablet 3     No current facility-administered medications for this visit.       Allergies  Allergies have been reviewed.  Oneil has No Known Allergies.    Social History  Oneil  reports that he has been smoking cigarettes. He

## 2024-11-07 ENCOUNTER — HOSPITAL ENCOUNTER (OUTPATIENT)
Age: 64
Setting detail: SPECIMEN
Discharge: HOME | End: 2024-11-07
Payer: MEDICARE

## 2024-11-07 DIAGNOSIS — E78.00 PURE HYPERCHOLESTEROLEMIA: ICD-10-CM

## 2024-11-07 LAB
ALBUMIN SERPL-MCNC: 4.6 G/DL (ref 3.5–5.2)
ALBUMIN/GLOB SERPL: 1.8 {RATIO} (ref 1–2.5)
ALP SERPL-CCNC: 92 U/L (ref 40–129)
ALT SERPL-CCNC: 25 U/L (ref 10–50)
AST SERPL-CCNC: 25 U/L (ref 10–50)
BILIRUB DIRECT SERPL-MCNC: 0.5 MG/DL (ref 0–0.2)
BILIRUB INDIRECT SERPL-MCNC: 0.6 MG/DL (ref 0–1)
BILIRUB SERPL-MCNC: 1.1 MG/DL (ref 0–1.2)
CHOLEST SERPL-MCNC: 107 MG/DL (ref 0–199)
CHOLESTEROL/HDL RATIO: 2.5
GLOBULIN SER CALC-MCNC: 2.5 G/DL
HDLC SERPL-MCNC: 43 MG/DL
LDLC SERPL CALC-MCNC: 51 MG/DL (ref 0–100)
PROT SERPL-MCNC: 7.1 G/DL (ref 6.6–8.7)
TRIGL SERPL-MCNC: 64 MG/DL (ref 0–149)
VLDLC SERPL CALC-MCNC: 13 MG/DL (ref 1–30)

## 2024-11-07 PROCEDURE — 80076 HEPATIC FUNCTION PANEL: CPT

## 2024-11-07 PROCEDURE — 36415 COLL VENOUS BLD VENIPUNCTURE: CPT

## 2024-11-07 PROCEDURE — 80061 LIPID PANEL: CPT

## 2024-11-21 ENCOUNTER — HOSPITAL ENCOUNTER (OUTPATIENT)
Dept: CT IMAGING | Age: 64
Discharge: HOME | End: 2024-11-23
Attending: STUDENT IN AN ORGANIZED HEALTH CARE EDUCATION/TRAINING PROGRAM
Payer: MEDICARE

## 2024-11-21 VITALS — HEIGHT: 69 IN | WEIGHT: 146 LBS | BODY MASS INDEX: 21.62 KG/M2

## 2024-11-21 DIAGNOSIS — Z87.891 PERSONAL HISTORY OF TOBACCO USE: ICD-10-CM

## 2024-11-21 PROCEDURE — 71271 CT THORAX LUNG CANCER SCR C-: CPT

## 2025-05-12 ENCOUNTER — HOSPITAL ENCOUNTER (OUTPATIENT)
Dept: ULTRASOUND IMAGING | Age: 65
Discharge: HOME | End: 2025-05-14
Payer: MEDICARE

## 2025-05-12 DIAGNOSIS — R07.0 CHRONIC THROAT PAIN: ICD-10-CM

## 2025-05-12 DIAGNOSIS — G89.29 CHRONIC THROAT PAIN: ICD-10-CM

## 2025-05-12 PROCEDURE — 76536 US EXAM OF HEAD AND NECK: CPT
